# Patient Record
Sex: MALE | URBAN - METROPOLITAN AREA
[De-identification: names, ages, dates, MRNs, and addresses within clinical notes are randomized per-mention and may not be internally consistent; named-entity substitution may affect disease eponyms.]

---

## 2022-11-10 ENCOUNTER — APPOINTMENT (RX ONLY)
Dept: URBAN - METROPOLITAN AREA CLINIC 374 | Facility: CLINIC | Age: 22
Setting detail: DERMATOLOGY
End: 2022-11-10

## 2022-11-10 DIAGNOSIS — L63.8 OTHER ALOPECIA AREATA: ICD-10-CM

## 2022-11-10 PROCEDURE — 99203 OFFICE O/P NEW LOW 30 MIN: CPT

## 2022-11-10 PROCEDURE — ? PHOTO-DOCUMENTATION

## 2022-11-10 PROCEDURE — ? MEDICATION COUNSELING

## 2022-11-10 PROCEDURE — ? PRESCRIPTION

## 2022-11-10 PROCEDURE — ? COUNSELING

## 2022-11-10 PROCEDURE — ? ORDER TESTS

## 2022-11-10 PROCEDURE — ? PRESCRIPTION MEDICATION MANAGEMENT

## 2022-11-10 RX ORDER — TRIAMCINOLONE ACETONIDE 1 MG/ML
1 LOTION TOPICAL QHS
Qty: 60 | Refills: 2 | Status: ERX | COMMUNITY
Start: 2022-11-10

## 2022-11-10 RX ADMIN — TRIAMCINOLONE ACETONIDE 1: 1 LOTION TOPICAL at 00:00

## 2022-11-10 ASSESSMENT — LOCATION DETAILED DESCRIPTION DERM
LOCATION DETAILED: RIGHT CHIN
LOCATION DETAILED: LEFT POSTAURICULAR SKIN
LOCATION DETAILED: SUPERIOR MID FOREHEAD

## 2022-11-10 ASSESSMENT — LOCATION SIMPLE DESCRIPTION DERM
LOCATION SIMPLE: CHIN
LOCATION SIMPLE: POSTERIOR SCALP
LOCATION SIMPLE: SUPERIOR FOREHEAD

## 2022-11-10 ASSESSMENT — LOCATION ZONE DERM
LOCATION ZONE: SCALP
LOCATION ZONE: FACE

## 2022-11-10 NOTE — PROCEDURE: ORDER TESTS
Bill For Surgical Tray: no
Expected Date Of Service: 11/10/2022
Billing Type: Third-Party Bill
Performing Laboratory: -[id number]

## 2023-01-13 NOTE — PROCEDURE: MEDICATION COUNSELING
James Boyle Hydroxychloroquine Counseling:  I discussed with the patient that a baseline ophthalmologic exam is needed at the start of therapy and every year thereafter while on therapy. A CBC may also be warranted for monitoring.  The side effects of this medication were discussed with the patient, including but not limited to agranulocytosis, aplastic anemia, seizures, rashes, retinopathy, and liver toxicity. Patient instructed to call the office should any adverse effect occur.  The patient verbalized understanding of the proper use and possible adverse effects of Plaquenil.  All the patient's questions and concerns were addressed.

## 2023-01-18 ENCOUNTER — APPOINTMENT (RX ONLY)
Dept: URBAN - METROPOLITAN AREA CLINIC 374 | Facility: CLINIC | Age: 23
Setting detail: DERMATOLOGY
End: 2023-01-18

## 2023-01-18 DIAGNOSIS — L63.8 OTHER ALOPECIA AREATA: ICD-10-CM | Status: STABLE

## 2023-01-18 PROCEDURE — ? COUNSELING

## 2023-01-18 PROCEDURE — ? MEDICATION COUNSELING

## 2023-01-18 PROCEDURE — ? PRESCRIPTION MEDICATION MANAGEMENT

## 2023-01-18 PROCEDURE — ? PHOTO-DOCUMENTATION

## 2023-01-18 PROCEDURE — ? PRESCRIPTION

## 2023-01-18 PROCEDURE — 99213 OFFICE O/P EST LOW 20 MIN: CPT

## 2023-01-18 PROCEDURE — ? DEFER

## 2023-01-18 RX ORDER — CLOBETASOL PROPIONATE 0.5 MG/ML
SOLUTION TOPICAL QHS
Qty: 50 | Refills: 2 | Status: ERX | COMMUNITY
Start: 2023-01-18

## 2023-01-18 RX ADMIN — CLOBETASOL PROPIONATE: 0.5 SOLUTION TOPICAL at 00:00

## 2023-01-18 ASSESSMENT — LOCATION SIMPLE DESCRIPTION DERM
LOCATION SIMPLE: SUPERIOR FOREHEAD
LOCATION SIMPLE: POSTERIOR SCALP
LOCATION SIMPLE: CHIN

## 2023-01-18 ASSESSMENT — LOCATION DETAILED DESCRIPTION DERM
LOCATION DETAILED: SUPERIOR MID FOREHEAD
LOCATION DETAILED: LEFT POSTAURICULAR SKIN
LOCATION DETAILED: MID-OCCIPITAL SCALP
LOCATION DETAILED: RIGHT CHIN

## 2023-01-18 ASSESSMENT — LOCATION ZONE DERM
LOCATION ZONE: FACE
LOCATION ZONE: SCALP

## 2023-01-18 NOTE — PROCEDURE: PRESCRIPTION MEDICATION MANAGEMENT
Render In Strict Bullet Format?: No
Plan: Recommended OTC Minoxidil 5% topical foam/solution QAM\\n\\nPt will go to get his labs done, order sent in November.
Initiate Treatment: clobetasol 0.05% scalp solution: Scatter several drops onto affected areas of scalp QHS (do not use on face)
Continue Regimen: triamcinolone acetonide 0.1% lotion: Apply a thin layer QHS to AA of beard area
Detail Level: Zone

## 2023-01-18 NOTE — PROCEDURE: MEDICATION COUNSELING
none 5-Fu Pregnancy And Lactation Text: This medication is Pregnancy Category X and contraindicated in pregnancy and in women who may become pregnant. It is unknown if this medication is excreted in breast milk.

## 2023-01-18 NOTE — PROCEDURE: DEFER
Detail Level: Simple
Size Of Lesion In Cm (Optional): 0
Introduction Text (Please End With A Colon): The following was deferred:
Procedure To Be Performed At Next Visit: Intralesional Kenalog

## 2023-01-18 NOTE — PROCEDURE: MEDICATION COUNSELING
Alert Cibinqo Pregnancy And Lactation Text: It is unknown if this medication will adversely affect pregnancy or breast feeding.  You should not take this medication if you are currently pregnant or planning a pregnancy or while breastfeeding.

## 2023-04-29 NOTE — PROCEDURE: MEDICATION COUNSELING
Pt aaox4. VSS on RA. Visi monitoring in place. Pt had palliative radiation yesterday. Pt still having small pellet BM's, stated 1 was bloody and two were normal. Pt states he is urinating without difficulty. Plan for colostomy next week. Prn tylenol and mucinex given x1 overnight. Pt up independently to bathroom. Wife at bedside assisting with care. VS and assessments in flowsheets.   Opioid Counseling: I discussed with the patient the potential side effects of opioids including but not limited to addiction, altered mental status, and depression. I stressed avoiding alcohol, benzodiazepines, muscle relaxants and sleep aids unless specifically okayed by a physician. The patient verbalized understanding of the proper use and possible adverse effects of opioids. All of the patient's questions and concerns were addressed. They were instructed to flush the remaining pills down the toilet if they did not need them for pain.

## 2023-07-06 ENCOUNTER — APPOINTMENT (RX ONLY)
Dept: URBAN - METROPOLITAN AREA CLINIC 374 | Facility: CLINIC | Age: 23
Setting detail: DERMATOLOGY
End: 2023-07-06

## 2023-07-06 DIAGNOSIS — L63.8 OTHER ALOPECIA AREATA: ICD-10-CM

## 2023-07-06 PROCEDURE — ? ORDER TESTS

## 2023-07-06 PROCEDURE — ? COUNSELING

## 2023-07-06 PROCEDURE — ? PRESCRIPTION MEDICATION MANAGEMENT

## 2023-07-06 PROCEDURE — ? RECOMMENDATIONS

## 2023-07-06 PROCEDURE — ? PRESCRIPTION

## 2023-07-06 PROCEDURE — ? PHOTO-DOCUMENTATION

## 2023-07-06 PROCEDURE — ? MEDICATION COUNSELING

## 2023-07-06 PROCEDURE — 99214 OFFICE O/P EST MOD 30 MIN: CPT

## 2023-07-06 PROCEDURE — ? ADDITIONAL NOTES

## 2023-07-06 RX ORDER — CLOBETASOL PROPIONATE 0.5 MG/ML
SOLUTION TOPICAL QHS
Qty: 50 | Refills: 2 | Status: ERX

## 2023-07-06 RX ORDER — TRIAMCINOLONE ACETONIDE 1 MG/ML
1 LOTION TOPICAL QHS
Qty: 60 | Refills: 2 | Status: ERX

## 2023-07-06 ASSESSMENT — LOCATION DETAILED DESCRIPTION DERM
LOCATION DETAILED: MID-OCCIPITAL SCALP
LOCATION DETAILED: RIGHT CHIN
LOCATION DETAILED: SUPERIOR MID FOREHEAD
LOCATION DETAILED: LEFT POSTAURICULAR SKIN

## 2023-07-06 ASSESSMENT — LOCATION SIMPLE DESCRIPTION DERM
LOCATION SIMPLE: SUPERIOR FOREHEAD
LOCATION SIMPLE: CHIN
LOCATION SIMPLE: POSTERIOR SCALP

## 2023-07-06 ASSESSMENT — LOCATION ZONE DERM
LOCATION ZONE: FACE
LOCATION ZONE: SCALP

## 2023-07-06 NOTE — PROCEDURE: PRESCRIPTION MEDICATION MANAGEMENT
Render In Strict Bullet Format?: No
Plan: d/c \\ntriamcinolone acetonide 0.1% lotion: Apply a thin layer QHS to AA of beard area\\nclobetasol 0.05% scalp solution: Scatter several drops onto affected areas of scalp QHS (do not use on face)
Continue Regimen: triamcinolone acetonide 0.1% lotion: Apply a thin layer QHS to AA of beard area\\nclobetasol 0.05% scalp solution: Scatter several drops onto affected areas of scalp QHS (do not use on face)
Detail Level: Zone

## 2023-07-06 NOTE — PROCEDURE: MIPS QUALITY
Detail Level: Detailed
Quality 130: Documentation Of Current Medications In The Medical Record: Current Medications Documented
Quality 431: Preventive Care And Screening: Unhealthy Alcohol Use - Screening: Patient did not receive brief counseling if identified as an unhealthy alcohol user

## 2023-07-06 NOTE — PROCEDURE: ORDER TESTS
Bill For Surgical Tray: no
Billing Type: Third-Party Bill
Lab Facility: [id number]
Expected Date Of Service: 07/06/2023
Performing Laboratory: -[id number]

## 2023-07-06 NOTE — PROCEDURE: ADDITIONAL NOTES
Additional Notes: pt will have labs sent from PCP
Detail Level: Zone
Render Risk Assessment In Note?: yes

## 2023-07-06 NOTE — PROCEDURE: RECOMMENDATIONS
Recommendation Preamble: The following recommendations were made during the visit:
Recommendations (Free Text): minoxidil 5% BID
Render Risk Assessment In Note?: no
Detail Level: Zone

## 2024-08-02 ENCOUNTER — APPOINTMENT (EMERGENCY)
Dept: RADIOLOGY | Facility: HOSPITAL | Age: 24
End: 2024-08-02
Attending: STUDENT IN AN ORGANIZED HEALTH CARE EDUCATION/TRAINING PROGRAM

## 2024-08-02 ENCOUNTER — HOSPITAL ENCOUNTER (EMERGENCY)
Facility: HOSPITAL | Age: 24
Discharge: HOME | End: 2024-08-03
Attending: STUDENT IN AN ORGANIZED HEALTH CARE EDUCATION/TRAINING PROGRAM
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
## 2024-08-02 DIAGNOSIS — S01.312A EAR LOBE LACERATION, LEFT, INITIAL ENCOUNTER: ICD-10-CM

## 2024-08-02 LAB
ALBUMIN SERPL-MCNC: 4 G/DL (ref 3.5–5.7)
ALP SERPL-CCNC: 57 IU/L (ref 34–125)
ALT SERPL-CCNC: 14 IU/L (ref 7–52)
AMPHET UR QL SCN: NOT DETECTED
ANION GAP SERPL CALC-SCNC: 8 MEQ/L (ref 3–15)
APTT PPP: 28 SEC (ref 23–35)
AST SERPL-CCNC: 21 IU/L (ref 13–39)
BARBITURATES UR QL SCN: NOT DETECTED
BASOPHILS # BLD: 0.03 K/UL (ref 0.01–0.1)
BASOPHILS NFR BLD: 0.4 %
BENZODIAZ UR QL SCN: NOT DETECTED
BILIRUB SERPL-MCNC: 0.2 MG/DL (ref 0.3–1.2)
BILIRUB UR QL STRIP.AUTO: NEGATIVE MG/DL
BUN SERPL-MCNC: 12 MG/DL (ref 7–25)
CALCIUM SERPL-MCNC: 7.7 MG/DL (ref 8.6–10.3)
CANNABINOIDS UR QL SCN: POSITIVE
CHLORIDE SERPL-SCNC: 112 MEQ/L (ref 98–107)
CLARITY UR REFRACT.AUTO: CLEAR
CO2 SERPL-SCNC: 25 MEQ/L (ref 21–31)
COCAINE UR QL SCN: NOT DETECTED
COLOR UR AUTO: COLORLESS
CREAT SERPL-MCNC: 1.1 MG/DL (ref 0.7–1.3)
DIFFERENTIAL METHOD BLD: ABNORMAL
EGFRCR SERPLBLD CKD-EPI 2021: >60 ML/MIN/1.73M*2
EOSINOPHIL # BLD: 0.25 K/UL (ref 0.04–0.54)
EOSINOPHIL NFR BLD: 3.4 %
ERYTHROCYTE [DISTWIDTH] IN BLOOD BY AUTOMATED COUNT: 13 % (ref 11.6–14.4)
ETHANOL SERPL-MCNC: 441 MG/DL
FENTANYL URINE SCR: NOT DETECTED
GLUCOSE SERPL-MCNC: 102 MG/DL (ref 70–99)
GLUCOSE UR STRIP.AUTO-MCNC: NEGATIVE MG/DL
HCT VFR BLD AUTO: 37.8 % (ref 40.1–51)
HGB BLD-MCNC: 13 G/DL (ref 13.7–17.5)
HGB UR QL STRIP.AUTO: NEGATIVE
IMM GRANULOCYTES # BLD AUTO: 0.01 K/UL (ref 0–0.08)
IMM GRANULOCYTES NFR BLD AUTO: 0.1 %
INR PPP: 1
KETONES UR STRIP.AUTO-MCNC: NEGATIVE MG/DL
LEUKOCYTE ESTERASE UR QL STRIP.AUTO: NEGATIVE
LIPASE SERPL-CCNC: 35 U/L (ref 11–82)
LYMPHOCYTES # BLD: 3.57 K/UL (ref 1.2–3.5)
LYMPHOCYTES NFR BLD: 48 %
MCH RBC QN AUTO: 33.3 PG (ref 28–33.2)
MCHC RBC AUTO-ENTMCNC: 34.4 G/DL (ref 32.2–36.5)
MCV RBC AUTO: 96.9 FL (ref 83–98)
MONOCYTES # BLD: 0.36 K/UL (ref 0.3–1)
MONOCYTES NFR BLD: 4.8 %
NEUTROPHILS # BLD: 3.21 K/UL (ref 1.7–7)
NEUTS SEG NFR BLD: 43.3 %
NITRITE UR QL STRIP.AUTO: NEGATIVE
NRBC BLD-RTO: 0 %
OPIATES UR QL SCN: NOT DETECTED
PCP UR QL SCN: NOT DETECTED
PDW BLD AUTO: 9 FL (ref 9.4–12.4)
PH UR STRIP.AUTO: 6.5 [PH]
PLATELET # BLD AUTO: 240 K/UL (ref 150–350)
POTASSIUM SERPL-SCNC: 3.3 MEQ/L (ref 3.5–5.1)
PROT SERPL-MCNC: 6 G/DL (ref 6–8.2)
PROT UR QL STRIP.AUTO: NEGATIVE
PROTHROMBIN TIME: 13.4 SEC (ref 12.2–14.5)
RBC # BLD AUTO: 3.9 M/UL (ref 4.5–5.8)
SODIUM SERPL-SCNC: 145 MEQ/L (ref 136–145)
SP GR UR REFRACT.AUTO: 1.01
UROBILINOGEN UR STRIP-ACNC: 0.2 EU/DL
WBC # BLD AUTO: 7.43 K/UL (ref 3.8–10.5)

## 2024-08-02 PROCEDURE — 99284 EMERGENCY DEPT VISIT MOD MDM: CPT | Mod: 25

## 2024-08-02 PROCEDURE — 72125 CT NECK SPINE W/O DYE: CPT

## 2024-08-02 PROCEDURE — 74177 CT ABD & PELVIS W/CONTRAST: CPT

## 2024-08-02 PROCEDURE — 86900 BLOOD TYPING SEROLOGIC ABO: CPT

## 2024-08-02 PROCEDURE — 36415 COLL VENOUS BLD VENIPUNCTURE: CPT | Performed by: STUDENT IN AN ORGANIZED HEALTH CARE EDUCATION/TRAINING PROGRAM

## 2024-08-02 PROCEDURE — 63600105 HC IODINE BASED CONTRAST: Mod: JZ | Performed by: STUDENT IN AN ORGANIZED HEALTH CARE EDUCATION/TRAINING PROGRAM

## 2024-08-02 PROCEDURE — 96360 HYDRATION IV INFUSION INIT: CPT

## 2024-08-02 PROCEDURE — 86901 BLOOD TYPING SEROLOGIC RH(D): CPT

## 2024-08-02 PROCEDURE — 86850 RBC ANTIBODY SCREEN: CPT

## 2024-08-02 PROCEDURE — 85610 PROTHROMBIN TIME: CPT | Performed by: STUDENT IN AN ORGANIZED HEALTH CARE EDUCATION/TRAINING PROGRAM

## 2024-08-02 PROCEDURE — G0480 DRUG TEST DEF 1-7 CLASSES: HCPCS | Performed by: STUDENT IN AN ORGANIZED HEALTH CARE EDUCATION/TRAINING PROGRAM

## 2024-08-02 PROCEDURE — 0JQ13ZZ REPAIR FACE SUBCUTANEOUS TISSUE AND FASCIA, PERCUTANEOUS APPROACH: ICD-10-PCS | Performed by: STUDENT IN AN ORGANIZED HEALTH CARE EDUCATION/TRAINING PROGRAM

## 2024-08-02 PROCEDURE — 80307 DRUG TEST PRSMV CHEM ANLYZR: CPT | Performed by: STUDENT IN AN ORGANIZED HEALTH CARE EDUCATION/TRAINING PROGRAM

## 2024-08-02 PROCEDURE — 81003 URINALYSIS AUTO W/O SCOPE: CPT | Performed by: STUDENT IN AN ORGANIZED HEALTH CARE EDUCATION/TRAINING PROGRAM

## 2024-08-02 PROCEDURE — 99283 EMERGENCY DEPT VISIT LOW MDM: CPT | Performed by: STUDENT IN AN ORGANIZED HEALTH CARE EDUCATION/TRAINING PROGRAM

## 2024-08-02 PROCEDURE — 70450 CT HEAD/BRAIN W/O DYE: CPT

## 2024-08-02 PROCEDURE — 96361 HYDRATE IV INFUSION ADD-ON: CPT

## 2024-08-02 PROCEDURE — 80053 COMPREHEN METABOLIC PANEL: CPT | Performed by: STUDENT IN AN ORGANIZED HEALTH CARE EDUCATION/TRAINING PROGRAM

## 2024-08-02 PROCEDURE — 83690 ASSAY OF LIPASE: CPT | Performed by: STUDENT IN AN ORGANIZED HEALTH CARE EDUCATION/TRAINING PROGRAM

## 2024-08-02 PROCEDURE — 71260 CT THORAX DX C+: CPT

## 2024-08-02 PROCEDURE — 85730 THROMBOPLASTIN TIME PARTIAL: CPT | Performed by: STUDENT IN AN ORGANIZED HEALTH CARE EDUCATION/TRAINING PROGRAM

## 2024-08-02 PROCEDURE — 85025 COMPLETE CBC W/AUTO DIFF WBC: CPT | Performed by: STUDENT IN AN ORGANIZED HEALTH CARE EDUCATION/TRAINING PROGRAM

## 2024-08-02 RX ORDER — IOPAMIDOL 755 MG/ML
100 INJECTION, SOLUTION INTRAVASCULAR
Status: COMPLETED | OUTPATIENT
Start: 2024-08-02 | End: 2024-08-02

## 2024-08-02 RX ADMIN — IOPAMIDOL 100 ML: 755 INJECTION, SOLUTION INTRAVENOUS at 23:17

## 2024-08-03 VITALS
SYSTOLIC BLOOD PRESSURE: 125 MMHG | TEMPERATURE: 96.9 F | HEART RATE: 78 BPM | WEIGHT: 133.38 LBS | RESPIRATION RATE: 16 BRPM | DIASTOLIC BLOOD PRESSURE: 75 MMHG | OXYGEN SATURATION: 94 %

## 2024-08-03 LAB
ABO + RH BLD: NORMAL
BLD GP AB SCN SERPL QL: NEGATIVE
D AG BLD QL: POSITIVE
LABORATORY COMMENT REPORT: NORMAL
LABORATORY COMMENT REPORT: NORMAL
SPECIMEN EXP DATE BLD: NORMAL

## 2024-08-03 PROCEDURE — 63600000 HC DRUGS/DETAIL CODE: Mod: JZ

## 2024-08-03 RX ORDER — CIPROFLOXACIN 500 MG/1
500 TABLET ORAL 2 TIMES DAILY
Qty: 14 TABLET | Refills: 0 | Status: SHIPPED | OUTPATIENT
Start: 2024-08-03 | End: 2024-08-10

## 2024-08-03 RX ADMIN — SODIUM CHLORIDE, POTASSIUM CHLORIDE, SODIUM LACTATE AND CALCIUM CHLORIDE 1000 ML: 600; 310; 30; 20 INJECTION, SOLUTION INTRAVENOUS at 00:54

## 2024-08-03 NOTE — DISCHARGE INSTRUCTIONS
You were seen in emergency department after a fall you are intoxicated at that time you had a CT scan which revealed no other major injuries you did have a laceration to your ear we were unable to fully repair the cartilage but would recommend following up with an ear nose and throat doctor and would also recommend using his antibiotics

## 2024-08-03 NOTE — ED PROVIDER NOTES
HPI    Chief Complaint   Patient presents with   • Trauma   • Fall        HPI   24-year-old male who presents via EMS for fall.  Patient was reportedly drinking and smoking marijuana when he fell down 15 hardwood stairs.  Was witnessed by bystanders, no reported LOC.  Patient does not take any medications per report.    Per EMS, patient was initially GCS 15 but clearly intoxicated, reportedly combative and uncooperative with EMS and so received 5 mg Versed and transported to hospital.  Upon arrival, patient not responding to verbal or painful stimuli.  Unable to obtain further history.      History reviewed. No pertinent past medical history.      History reviewed. No pertinent surgical history.    History reviewed. No pertinent family history.    Social History     Tobacco Use   • Smoking status: Unknown   Substance Use Topics   • Alcohol use: Yes   • Drug use: Defer       Systems Reviewed from Nursing Triage:               Review of Systems         ED Triage Vitals [08/02/24 2259]   Temp Heart Rate Resp BP SpO2   (!) 36.1 °C (96.9 °F) 90 16 (!) 118/56 96 %      Temp Source Heart Rate Source Patient Position BP Location FiO2 (%) (Set)   Temporal Monitor Lying Right upper arm --       Vitals:    08/02/24 2303 08/02/24 2310 08/02/24 2327 08/02/24 2330   BP:  (!) 107/58  (!) 106/58   BP Location:  Right upper arm  Right upper arm   Patient Position:  Lying  Lying   Pulse: 87 83  84   Resp: (!) 28 16  16   Temp:       TempSrc:       SpO2: 99% 100%  96%   Weight:   60.5 kg (133 lb 6.1 oz)                          Physical Exam   Breathing Effort: Spontaneous Breath Sounds Right: Clear Breath Sounds Left: Clear  Skin: Within Defined Limits Skin Color: Appropriate for ethnicity Bilateral Radial Pulses: 2+ (normal) Bilateral Femoral Pulses: 2+ (normal) Bilateral Dorsalis Pedis Pulses: other (see comments) Capillary Refill, General: less than/equal to 3 secs Uncontrolled External Bleeding: No Emergency Release Blood: No  Was Massive Transfusion Protocol (MTP) initiated?: No  Best Eye Response: 1-->(E1) none Best Verbal Response: 1-->(V1) none Best Motor Response: 1-->(M1) none Lorena Coma Scale Score: 3 Assessment Qualifiers: patient chemically sedated or paralyzed (pt given 5mg Versed PTA by EMS)  Pupil PERRL: yes Pupil Size Left: 4 mm Pupil Shape Left: round Pupil Reaction Left: brisk Pupil Size Right: 4 mm Pupil Shape Right: round Pupil Reaction Right: brisk  Clothing removed/Clothing cut: Removed Clothing removed/cut : In ED  Room Temp Control 78° F?: Yes Warming Devices: blanket warmed  Head/Face: Laceration Eyes: Within Defined Limits Ears: Other (Comment) (laceration) Nose: Within Defined Limits Mouth: Within Defined Limits  Neck: Within Defined Limits  Chest: Within Defined Limits  Abdomen : Within Defined Limits Pelvis: Within Defined Limits  - Blood At Meatus: No  R Upper Extremity: Within Defined Limits RUE Muscle Strength Gradin - no contraction L Upper Extremity: Within Defined Limits LUE Muscle Strength Gradin - no contraction R Lower Extremity: Within Defined Limits RLE Muscle Strength Gradin - no contraction L Lower Extremity: Other (Comment) (knee abrasion) LLE Muscle Strength Gradin - no contraction  Posterior Inspection: Within Defined Limits          CT HEAD WITHOUT IV CONTRAST    (Results Pending)   CT CERVICAL SPINE WITHOUT IV CONTRAST    (Results Pending)   CT CHEST WITH IV CONTRAST    (Results Pending)   CT ABDOMEN PELVIS WITH IV CONTRAST    (Results Pending)       Labs Reviewed   CBC AND DIFF - Abnormal       Result Value    WBC 7.43      RBC 3.90 (*)     Hemoglobin 13.0 (*)     Hematocrit 37.8 (*)     MCV 96.9      MCH 33.3 (*)     MCHC 34.4      RDW 13.0      Platelets 240      MPV 9.0 (*)     Differential Type Auto      nRBC 0.0      Immature Granulocytes 0.1      Neutrophils 43.3      Lymphocytes 48.0      Monocytes 4.8      Eosinophils 3.4      Basophils 0.4      Immature  Granulocytes, Absolute 0.01      Neutrophils, Absolute 3.21      Lymphocytes, Absolute 3.57 (*)     Monocytes, Absolute 0.36      Eosinophils, Absolute 0.25      Basophils, Absolute 0.03     COMPREHENSIVE METABOLIC PANEL - Abnormal    Sodium 145      Potassium 3.3 (*)     Chloride 112 (*)     CO2 25      BUN 12      Creatinine 1.1      Glucose 102 (*)     Calcium 7.7 (*)     AST (SGOT) 21      ALT (SGPT) 14      Alkaline Phosphatase 57      Total Protein 6.0      Albumin 4.0      Bilirubin, Total 0.2 (*)     eGFR >60.0      Anion Gap 8     DRUG SCREEN PANEL, URINE - Abnormal    PCP Scrn, Ur Not Detected      Benzodiazepine Ur Qual Not Detected      Cocaine Screen, Urine Not Detected      Amphetamine+Methamphetamine Screen, Ur Not Detected      Cannabinoid Screen, Urine Positive (*)     Opiate Scrn, Ur Not Detected      Barbiturate Screen, Ur Not Detected      Fentanyl Screen, Urine Not Detected     LIPASE - Normal    Lipase 35     PROTIME-INR - Normal    PT 13.4      INR 1.0     PTT - Normal    PTT 28     UA MACROSCOPIC - Normal    Color, Urine Colorless      Clarity, Urine Clear      Specific Gravity, Urine 1.007      pH, Urine 6.5      Leukocyte Esterase Negative      Nitrite, Urine Negative      Protein, Urine Negative      Glucose, Urine Negative      Ketones, Urine Negative      Urobilinogen, Urine 0.2      Bilirubin, Urine Negative      Blood, Urine Negative     URINALYSIS W REFLEX TO MICROSCOPIC    Narrative:     The following orders were created for panel order Urinalysis.  Procedure                               Abnormality         Status                     ---------                               -----------         ------                     UA Macroscopic[[id number]]               Normal              Final result                 Please view results for these tests on the individual orders.   ETHANOL   TYPE AND SCREEN   RAINBOW DRAW PANEL    Narrative:     The following orders were created for panel order  Cable Draw Panel.  Procedure                               Abnormality         Status                     ---------                               -----------         ------                     RAINBOW RED[[id number]]                                      In process                 RAINBOW GOLD[[id number]]                                     In process                 RAINBOW LT GREEN[[id number]]                                 In process                   Please view results for these tests on the individual orders.   RAINBOW RED   RAINBOW GOLD   RAINBOW LT GREEN       CT HEAD WITHOUT IV CONTRAST    (Results Pending)   CT CERVICAL SPINE WITHOUT IV CONTRAST    (Results Pending)   CT CHEST WITH IV CONTRAST    (Results Pending)   CT ABDOMEN PELVIS WITH IV CONTRAST    (Results Pending)         Critical Care    Performed by: Mauricio Mccullough MD  Authorized by: Mauricio Mccullough MD    Critical care provider statement:     Critical care time (minutes):  30    Critical care time was exclusive of:  Separately billable procedures and treating other patients    Critical care was necessary to treat or prevent imminent or life-threatening deterioration of the following conditions:  CNS failure or compromise and trauma    Critical care was time spent personally by me on the following activities:  Ordering and performing treatments and interventions, ordering and review of laboratory studies, ordering and review of radiographic studies, re-evaluation of patient's condition, evaluation of patient's response to treatment, examination of patient and discussions with consultants      Final diagnoses:   None       ED Course & MDM     ED Course as of 08/03/24 0520   Fri Aug 02, 2024   2347 Ethanol(!!): 441 [MS]   2347 Cannabinoid Screen, Urine(!): Positive [MS]   2347 Potassium, Bld(!): 3.3 [MS]   Sat Aug 03, 2024   0016 Discussed with trauma, no evidence of acute traumatic injuries on CT pan scan.  Trauma team to suture left ear laceration.   Plan for metabolize, reassessment when sober for dispo [MS]   0253 6 sutures placed lacerations of L ear  [DB]   0520 Patient is otherwise well-appearing at this time ambulatory without difficulty speaking full sentences spoke to his sister on the phone who was able to order a movement states she will be waiting for him when she gets home was given the number to call back if there are any issues [RW]      ED Course User Index  [DB] Jane Norris PA C  [MS] Mauricio Mccullough MD  [RW] Kwame Lama MD           Medical Decision Making  Amount and/or Complexity of Data Reviewed  Labs: ordered.  Radiology: ordered.    Risk  Prescription drug management.        11:42 PM      Impression/Medical Decision Makin-year-old male who presents after fall while intoxicated.  No LOC, no blood thinners.  Initial GCS 15 per EMS but patient was uncooperative and clearly intoxicated so received 5 mg Versed in the prehospital setting.  Arrived unresponsive but protecting airway, upgraded to code 9 activation on arrival, trauma team already present.    Primary survey intact, GCS 3 in the setting of Versed and EtOH  Given GCS 15 prior to administration of benzodiazepines and patient protecting airway, will closely monitor with pulse ox and end-tidal but defer intubation at this point  Secondary survey as above    Plan: IV, trauma labs, pan scan, observe    Vital Signs Review: Vital signs have been reviewed. The oxygen saturation is  SpO2: 96 % which is normal.      Mauricio Mccullough MD  2024      We are in a period of time with increased volumes and decreased capacity.     This document was created using dragon dictation software.  There might be some typographical errors due to this technology.       Mauricio Mccullough MD  [id number]       Mauricio Mccullough MD  24 [id number]

## 2024-08-03 NOTE — H&P
TRAUMA SURGERY:  HISTORY & PHYSICAL     PATIENT NAME:  Jai Wynn YOB: 2000    AGE:  24 y.o.  GENDER: male   MRN:  [id number]  PATIENT #: [id number]     Chief Complaint   Patient presents with    Trauma    Fall       HISTORY OF PRESENT ILLNESS/INJURY     Mechanism of Injury: fell down 15 steps     24 y.o. male who is intoxicated and had a witnessed fall down 15 steps. Combative with EMS and given 5 mg versed to facilitate transport    Relevant PMH: NONE     Pharmacological Risk Factors:   Oral Anti-Coagulation: NONE   Antiplatelet Therapy: NONE     Patient Vitals for the past 24 hrs:   BP Temp Temp src Pulse Resp SpO2 Weight   08/03/24 0049 100/60 -- -- 76 18 100 % --   08/02/24 2348 -- -- -- -- -- 98 % --   08/02/24 2330 (!) 106/58 -- -- 84 16 96 % --   08/02/24 2327 -- -- -- -- -- -- 60.5 kg (133 lb 6.1 oz)   08/02/24 2310 (!) 107/58 -- -- 83 16 100 % --   08/02/24 2303 -- -- -- 87 (!) 28 99 % --   08/02/24 2300 -- -- -- 95 (!) 25 98 % --   08/02/24 2259 (!) 118/56 (!) 36.1 °C (96.9 °F) Temporal 90 16 96 % --        REVIEW OF SYSTEMS     14 point ROS completed and pertinent positives as listed in HPI or as stated. All others negative.    PAST MEDICAL HISTORY     History reviewed. No pertinent past medical history.    PAST SURGICAL HISTORY     History reviewed. No pertinent surgical history.     FAMILY HISTORY     Non-contributory    SOCIAL HISTORY     Social History     Socioeconomic History    Marital status: Single     Spouse name: Not on file    Number of children: Not on file    Years of education: Not on file    Highest education level: Not on file   Occupational History    Not on file   Tobacco Use    Smoking status: Unknown    Smokeless tobacco: Not on file   Substance and Sexual Activity    Alcohol use: Yes    Drug use: Defer    Sexual activity: Not on file   Other Topics Concern    Not on file   Social History Narrative    Not on file     Social Determinants of Health      Financial Resource Strain: Not on file   Food Insecurity: Patient Unable To Answer (8/2/2024)    Hunger Vital Sign     Worried About Running Out of Food in the Last Year: Patient unable to answer     Ran Out of Food in the Last Year: Patient unable to answer   Transportation Needs: Not on file   Physical Activity: Not on file   Stress: Not on file   Social Connections: Not on file   Intimate Partner Violence: Not on file   Housing Stability: Not on file       HOME MEDICATIONS     none    ALLERGIES     unknown    PRIMARY CARE PHYSICIAN     Unable, To Obtain Pcp    PRIMARY SURVEY     Airway: Patent   Breathing: Spontaneous, non-labored, equal B/L breath sounds  Circulation:  Skin is pink/warm/dry, central and peripheral pulses present.  NSR   Disability: Initial GCS: 3. Unknown LOC  Exposure: complete    EYES:  4 = open spontaneously  3 = open to voice  2 = open to pain  1 = none VERBAL:  5 = oriented  4 = confused  3 = inappropriate words  2 = incomprehensible sounds  1 = none MOTOR:  6 = obeys   5 = localizes  4 = withdraws  3 = decortication (flexion)  2 = decerebration (extension)  1 = none or triple flexion     RESUSCITATION:     O2: 2 L NC Lines/Location: PIV  Two large bore PIVs:  No    Endotracheal Intubation: no Gastric Intubation: NONE   IVF/Blood: 1L NS bolus Antibiotic(s): none   Chest Tube(s):   R size:             L size: Tetanus:  Not Indicated   Manzano: no        SECONDARY SURVEY     NEURO: GCS 3.   HEENT: Left posterior auricular hematoma. Midface is stable. ZAINA @ 3mm, EOMi. Neck supple. Trachea ML.   SPINE: C-Collar in situ. There are no stepoffs/deformities.   CHEST: Equal chest expansion, chest wall stable, no crepitus.  LUNGS: LCTA bilaterally. No use of accessory muscles or respiratory distress.   CV: RRR, S1/S2. +2 radial/DP/femoral pulses.  ABDOMEN: Soft, nontender, nondistended.   PELVIS: Stable, non-tender.   : Genitalia unremarkable.  RECTAL: Digital rectal exam deferred.    EXTREMITIES: No palpable deformities.    SKIN: right thumb abrasion and left knee abrasion    Fast Exam: Deferred     DIAGNOSTIC DATA     LABS:  Recent Results (from the past 24 hour(s))   CBC and differential    Collection Time: 08/02/24 11:04 PM   Result Value Ref Range    WBC 7.43 3.80 - 10.50 K/uL    RBC 3.90 (L) 4.50 - 5.80 M/uL    Hemoglobin 13.0 (L) 13.7 - 17.5 g/dL    Hematocrit 37.8 (L) 40.1 - 51.0 %    MCV 96.9 83.0 - 98.0 fL    MCH 33.3 (H) 28.0 - 33.2 pg    MCHC 34.4 32.2 - 36.5 g/dL    RDW 13.0 11.6 - 14.4 %    Platelets 240 150 - 350 K/uL    MPV 9.0 (L) 9.4 - 12.4 fL    Differential Type Auto     nRBC 0.0 <=0.0 %    Immature Granulocytes 0.1 %    Neutrophils 43.3 %    Lymphocytes 48.0 %    Monocytes 4.8 %    Eosinophils 3.4 %    Basophils 0.4 %    Immature Granulocytes, Absolute 0.01 0.00 - 0.08 K/uL    Neutrophils, Absolute 3.21 1.70 - 7.00 K/uL    Lymphocytes, Absolute 3.57 (H) 1.20 - 3.50 K/uL    Monocytes, Absolute 0.36 0.30 - 1.00 K/uL    Eosinophils, Absolute 0.25 0.04 - 0.54 K/uL    Basophils, Absolute 0.03 0.01 - 0.10 K/uL   Comprehensive metabolic panel    Collection Time: 08/02/24 11:04 PM   Result Value Ref Range    Sodium 145 136 - 145 mEQ/L    Potassium 3.3 (L) 3.5 - 5.1 mEQ/L    Chloride 112 (H) 98 - 107 mEQ/L    CO2 25 21 - 31 mEQ/L    BUN 12 7 - 25 mg/dL    Creatinine 1.1 0.7 - 1.3 mg/dL    Glucose 102 (H) 70 - 99 mg/dL    Calcium 7.7 (L) 8.6 - 10.3 mg/dL    AST (SGOT) 21 13 - 39 IU/L    ALT (SGPT) 14 7 - 52 IU/L    Alkaline Phosphatase 57 34 - 125 IU/L    Total Protein 6.0 6.0 - 8.2 g/dL    Albumin 4.0 3.5 - 5.7 g/dL    Bilirubin, Total 0.2 (L) 0.3 - 1.2 mg/dL    eGFR >60.0 >=60.0 mL/min/1.73m*2    Anion Gap 8 3 - 15 mEQ/L   Ethanol, serum    Collection Time: 08/02/24 11:04 PM   Result Value Ref Range    Ethanol 441 (HH) <10 mg/dL   Lipase    Collection Time: 08/02/24 11:04 PM   Result Value Ref Range    Lipase 35 11 - 82 U/L   Protime-INR    Collection Time: 08/02/24 11:04 PM    Result Value Ref Range    PT 13.4 12.2 - 14.5 sec    INR 1.0     PTT    Collection Time: 08/02/24 11:04 PM   Result Value Ref Range    PTT 28 23 - 35 sec   Type and screen (blood bank hold specimen for 72 Hrs)    Collection Time: 08/02/24 11:04 PM   Result Value Ref Range    Specimen Expiration 08/05/2024     History Check No type on file     History Check No type on file    Drug screen panel, emergency    Collection Time: 08/02/24 11:05 PM   Result Value Ref Range    PCP Scrn, Ur Not Detected Not Detected    Benzodiazepine Ur Qual Not Detected Not Detected    Cocaine Screen, Urine Not Detected Not Detected    Amphetamine+Methamphetamine Screen, Ur Not Detected Not Detected    Cannabinoid Screen, Urine Positive (A) Not Detected    Opiate Scrn, Ur Not Detected Not Detected    Barbiturate Screen, Ur Not Detected Not Detected    Fentanyl Screen, Urine Not Detected Not Detected   UA Macroscopic    Collection Time: 08/02/24 11:05 PM   Result Value Ref Range    Color, Urine Colorless Yellow, Colorless    Clarity, Urine Clear Clear    Specific Gravity, Urine 1.007 1.005 - 1.030    pH, Urine 6.5 4.5 - 8.0    Leukocyte Esterase Negative Negative    Nitrite, Urine Negative Negative    Protein, Urine Negative Negative    Glucose, Urine Negative Negative mg/dL    Ketones, Urine Negative Negative mg/dL    Urobilinogen, Urine 0.2 <2.0 EU/dL EU/dL    Bilirubin, Urine Negative Negative mg/dL    Blood, Urine Negative Negative        Creatinine clearance cannot be calculated (Unknown ideal weight.)    IMAGINGS:    CT images - no traumatic injuries     IMPRESSION/PLAN     24 y.o. male s/p fall down steps   Heavily intoxicated   IVF   Will re-examine cervical spine once less inoxicated     DISPOSITION:  D/C Home - Dispo per ED

## 2024-08-03 NOTE — PROGRESS NOTES
"Jai Wynn   [id number]    Your doctor has referred you for a CT examination that requires the injection of an iodinated contrast material into your bloodstream. This iodinated contrast material, sometimes referred to as \"x-ray dye\" allows for better interpretation of the x-ray films or CT images and results in a more accurate interpretation of the examination.     Without the use of iodinated contrast (x-ray dye), the examination may be less informative and result in a suboptimal examination, and possibly a delay in diagnosis and, if needed, treatment.     The iodinated contrast material is given through a small needle or catheter placed into a vein, usually on the inside of the elbow, on the back of hand, or in a vein in the foot or lower leg.    The most common, though still rare, potential reaction to an intravenous contrast injection is an allergic-like reaction. Most allergic-like reactions are mild, though a small subset of people can have more severe reactions. Mild reactions include mild / scattered hives, itching, scratchy throat, sneezing and nasal congestion. More severe reactions include facial swelling, severe difficulty breathing, wheezing and anaphylactic shock. Those with a prior history allergic-like reaction to the same class of contrast media (such as CT contrast or MRI contrast) are at the highest risk for an allergic reaction.     If you believe you had an allergic reaction to contrast in the past, please let our staff know. We can determine if this increases your risk for a future reaction and provide steps to decrease the risk. This may delay your examination, but it decreases the risk of having a new and possibly more severe reaction to the contrast injection.    People with a history of prior allergic reactions to other substances (such as unrelated medications and food) and patients with a history of asthma have slightly increased risk for an allergic reaction from contrast " material when compared with the general population, but do not require to be pretreated prior to a contrast injection.    You should notify the physician, nurse or technologist if you have ever had any of these conditions or if you have any questions.

## 2024-08-03 NOTE — ED PROVIDER NOTES
Emergency Medicine Note  HPI   HISTORY OF PRESENT ILLNESS     HPI      Patient History   PAST HISTORY     Reviewed from Nursing Triage:       History reviewed. No pertinent past medical history.    History reviewed. No pertinent surgical history.    History reviewed. No pertinent family history.    Social History     Tobacco Use   • Smoking status: Unknown   Substance Use Topics   • Alcohol use: Yes   • Drug use: Defer         Review of Systems   REVIEW OF SYSTEMS     Review of Systems      VITALS     ED Vitals      Date/Time Temp Pulse Resp BP SpO2 [facility]   08/03/24 0149 -- 74 18 100/58 100 % DS   08/03/24 0119 -- 78 18 105/64 100 % DS   08/03/24 0049 -- 76 18 100/60 100 % DS   08/02/24 2348 -- -- -- -- 98 % DS   08/02/24 2330 -- 84 16 106/58 96 % LAS   08/02/24 2310 -- 83 16 107/58 100 % LAS   08/02/24 2303 -- 87 28 -- 99 % LAS   08/02/24 2300 -- 95 25 -- 98 % LAS   08/02/24 2259 36.1 °C (96.9 °F) 90 16 118/56 96 % LAS                         Physical Exam   PHYSICAL EXAM     Physical Exam      PROCEDURES     Laceration Repair    Date/Time: 8/3/2024 2:34 AM    Performed by: Jane Norris PA C  Authorized by: Mauricio Mccullough MD    Consent:     Consent obtained:  Verbal    Consent given by:  Patient    Risks discussed:  Infection, retained foreign body, pain, need for additional repair, nerve damage, poor wound healing, poor cosmetic result, tendon damage and vascular damage  Anesthesia:     Anesthesia method:  Local infiltration    Local anesthetic:  Lidocaine 1% w/o epi  Laceration details:     Location:  Ear    Ear location:  L ear (posterior L ear)    Length (cm):  4    Depth (mm):  1  Pre-procedure details:     Preparation:  Patient was prepped and draped in usual sterile fashion and imaging obtained to evaluate for foreign bodies  Treatment:     Area cleansed with:  Saline and Shur-Clens    Amount of cleaning:  Standard  Skin repair:     Repair method:  Sutures    Suture size:  5-0    Suture material:   Chromic gut    Suture technique:  Simple interrupted    Number of sutures:  4  Approximation:     Approximation:  Close  Post-procedure details:     Dressing:  Open (no dressing)    Procedure completion:  Tolerated well, no immediate complications  Laceration Repair    Date/Time: 8/3/2024 2:35 AM    Performed by: Jane Norris PA C  Authorized by: Mauricio Mccullough MD    Consent:     Consent obtained:  Verbal    Consent given by:  Patient    Risks discussed:  Infection, retained foreign body, pain, need for additional repair, nerve damage, poor wound healing, poor cosmetic result, tendon damage and vascular damage  Anesthesia:     Anesthesia method:  Local infiltration    Local anesthetic:  Lidocaine 1% w/o epi  Laceration details:     Location:  Ear    Ear location:  L ear (anterior ear)    Length (cm):  2    Depth (mm):  1  Pre-procedure details:     Preparation:  Patient was prepped and draped in usual sterile fashion and imaging obtained to evaluate for foreign bodies  Treatment:     Area cleansed with:  Saline and Shur-Clens    Amount of cleaning:  Standard  Skin repair:     Repair method:  Sutures    Suture size:  5-0    Suture material:  Chromic gut    Suture technique:  Simple interrupted    Number of sutures:  2  Approximation:     Approximation:  Close  Post-procedure details:     Dressing:  Open (no dressing)    Procedure completion:  Tolerated well, no immediate complications  Comments:      My only interaction with this patient was to repair both ear lacerations. I was not part of the rest of the patients care        DATA     Results       Procedure Component Value Units Date/Time    Type and screen (blood bank hold specimen for 72 Hrs) [[id number]] Collected: 08/02/24 2304    Specimen: Blood, Venous Updated: 08/03/24 0155     Specimen Expiration 08/05/2024     Antibody Screen Negative     ABO O     Rh Factor Positive     History Check No type on file     History Check No type on file     Comment:  BLTR requested to Kee @3998       Ethanol, serum [[id number]]  (Abnormal) Collected: 08/02/24 2304    Specimen: Blood, Venous Updated: 08/02/24 2345     Ethanol 441 mg/dL     Drug screen panel, emergency [[id number]]  (Abnormal) Collected: 08/02/24 2305    Specimen: Urine, Clean Catch Updated: 08/02/24 2341     PCP Scrn, Ur Not Detected     Comment: Assay Detects: phencyclidine in urine. Lowest detectable concentration is 25 ng/mL of phencyclidine.        Benzodiazepine Ur Qual Not Detected     Comment: Assay Detects: benzodiazepines and metabolites at varying concentrations. Lowest detectable concentration is 200 ng/mL of oxazepam.        Cocaine Screen, Urine Not Detected     Comment: Assay Detects: benzoylecgonine and cocaine in urine. Lowest detectable concentration is 300 ng/mL of benzoylecgonine.        Amphetamine+Methamphetamine Screen, Ur Not Detected     Comment: Assay Detects: d-methamphetamine, d-amphetamine, methlyenedioxyamphetamine (MDA), and methlyenendioxymethamphetamine (MDMA) in urine. Lowest detectable concentration is 1000 ng/mL of d-methamphetamine.<br>Assay is less sensitive to MDA and MDMA (lowest detectable concentration, 2500 ng/mL) and could produce a false negative result. If MDMA overdose is suspected and the result is negative, a more specific test should be requested.        Cannabinoid Screen, Urine Positive     Comment: Assay Detects: cannabinoid metabolites in urine. Lowest detectable concentration is 50 ng/mL        Opiate Scrn, Ur Not Detected     Comment: Assay Detects: codeine, dihydrocodeine, hydrocodone, hydromorphone, levorphanol, morphine, morphine-3-glucuronide, norcodeine, oxycodone in urine. Lowest detectable concentration is 300 ng/mL of morphine.        Barbiturate Screen, Ur Not Detected     Comment: Assay Detects: alphenal, amobarbital, aprobarbital, barbital, butabarbital, butalbital, butethal, diallybarbital, pentobarbital, secobarbital,talbutal, and thiopental.  Lowest detectable concentration is 200 ng/mL of secobarbital.        Fentanyl Screen, Urine Not Detected     Comment: Assay Detects: fentanyl metabolite in urine, lowest detectable concentration is 5 ng/mL of norfentanyl.       Comprehensive metabolic panel [[id number]]  (Abnormal) Collected: 08/02/24 2304    Specimen: Blood, Venous Updated: 08/02/24 2333     Sodium 145 mEQ/L      Potassium 3.3 mEQ/L      Comment: Results obtained on plasma. Plasma Potassium values may be up to 0.4 mEQ/L less than serum values. The differences may be greater for patients with high platelet or white cell counts.        Chloride 112 mEQ/L      CO2 25 mEQ/L      BUN 12 mg/dL      Creatinine 1.1 mg/dL      Glucose 102 mg/dL      Calcium 7.7 mg/dL      AST (SGOT) 21 IU/L      ALT (SGPT) 14 IU/L      Alkaline Phosphatase 57 IU/L      Total Protein 6.0 g/dL      Comment: Test performed on plasma which typically contains approximately 0.4 g/dL more protein than serum.        Albumin 4.0 g/dL      Bilirubin, Total 0.2 mg/dL      eGFR >60.0 mL/min/1.73m*2      Comment: Calculation based on the Chronic Kidney Disease Epidemiology Collaboration (CKD-EPI) equation refit without adjustment for race.        Anion Gap 8 mEQ/L     Lipase [[id number]]  (Normal) Collected: 08/02/24 2304    Specimen: Blood, Venous Updated: 08/02/24 2333     Lipase 35 U/L     Protime-INR [[id number]]  (Normal) Collected: 08/02/24 2304    Specimen: Blood, Venous Updated: 08/02/24 2325     PT 13.4 sec      INR 1.0     Comment: INR has no defined significance when PT is within Reference Range.       PTT [[id number]]  (Normal) Collected: 08/02/24 2304    Specimen: Blood, Venous Updated: 08/02/24 2325     PTT 28 sec     CBC and differential [[id number]]  (Abnormal) Collected: 08/02/24 2304    Specimen: Blood, Venous Updated: 08/02/24 2316     WBC 7.43 K/uL      RBC 3.90 M/uL      Hemoglobin 13.0 g/dL      Hematocrit 37.8 %      MCV 96.9 fL      MCH 33.3 pg      MCHC 34.4 g/dL       RDW 13.0 %      Platelets 240 K/uL      MPV 9.0 fL      Differential Type Auto     nRBC 0.0 %      Immature Granulocytes 0.1 %      Neutrophils 43.3 %      Lymphocytes 48.0 %      Monocytes 4.8 %      Eosinophils 3.4 %      Basophils 0.4 %      Immature Granulocytes, Absolute 0.01 K/uL      Neutrophils, Absolute 3.21 K/uL      Lymphocytes, Absolute 3.57 K/uL      Monocytes, Absolute 0.36 K/uL      Eosinophils, Absolute 0.25 K/uL      Basophils, Absolute 0.03 K/uL     Urinalysis [[id number]]  (Normal) Collected: 08/02/24 2305    Specimen: Urine, Clean Catch Updated: 08/02/24 2316    Narrative:      The following orders were created for panel order Urinalysis.  Procedure                               Abnormality         Status                     ---------                               -----------         ------                     UA Macroscopic[[id number]]               Normal              Final result                 Please view results for these tests on the individual orders.    UA Macroscopic [[id number]]  (Normal) Collected: 08/02/24 2305    Specimen: Urine, Clean Catch Updated: 08/02/24 2316     Color, Urine Colorless     Clarity, Urine Clear     Specific Gravity, Urine 1.007     pH, Urine 6.5     Leukocyte Esterase Negative     Comment: Results can be falsely negative due to high specific gravity, some antibiotics, glucose >3 g/dl, or WBC other than neutrophils.        Nitrite, Urine Negative     Protein, Urine Negative     Glucose, Urine Negative mg/dL      Ketones, Urine Negative mg/dL      Urobilinogen, Urine 0.2 EU/dL      Bilirubin, Urine Negative mg/dL      Blood, Urine Negative     Comment: The sensitivity of the occult blood test is equivalent to approximately 4 intact RBC/HPF.       RAINBOW GOLD [[id number]] Collected: 08/02/24 2304    Specimen: Blood, Venous Updated: 08/02/24 2311    RAINBOW RED [[id number]] Collected: 08/02/24 2304    Specimen: Blood, Venous Updated: 08/02/24 2311    [place] Draw Panel  [[id number]] Collected: 08/02/24 2304    Specimen: Blood, Venous Updated: 08/02/24 2311    Narrative:      The following orders were created for panel order [place] Draw Panel.  Procedure                               Abnormality         Status                     ---------                               -----------         ------                     RAINBOW RED[[id number]]                                      In process                 RAINBOW GOLD[[id number]]                                     In process                 RAINBOW LT GREEN[[id number]]                                 In process                   Please view results for these tests on the individual orders.    RAINBOW LT GREEN [[id number]] Collected: 08/02/24 2304    Specimen: Blood, Venous Updated: 08/02/24 2311            Imaging Results              CT CHEST WITH IV CONTRAST (In process)                      CT ABDOMEN PELVIS WITH IV CONTRAST (In process)                      CT HEAD WITHOUT IV CONTRAST (In process)                      CT CERVICAL SPINE WITHOUT IV CONTRAST (In process)                     No orders to display       Scoring tools                                  ED Course & MDM   MDM / ED COURSE / CLINICAL IMPRESSION / DISPO     Medical Decision Making  Amount and/or Complexity of Data Reviewed  Labs: ordered. Decision-making details documented in ED Course.  Radiology: ordered.    Risk  Prescription drug management.        ED Course as of 08/03/24 0520   Fri Aug 02, 2024   2347 Ethanol(!!): 441 [MS]   2347 Cannabinoid Screen, Urine(!): Positive [MS]   2347 Potassium, Bld(!): 3.3 [MS]   Sat Aug 03, 2024   0016 Discussed with trauma, no evidence of acute traumatic injuries on CT pan scan.  Trauma team to suture left ear laceration.  Plan for metabolize, reassessment when sober for dispo [MS]   0253 6 sutures placed lacerations of L ear  [DB]   0520 Patient is otherwise well-appearing at this time ambulatory without difficulty speaking  full sentences spoke to his sister on the phone who was able to order a movement states she will be waiting for him when she gets home was given the number to call back if there are any issues [RW]      ED Course User Index  [DB] Jane Norris PA C  [MS] Mauricio Mccullough MD  [RW] Kwame Lama MD     Clinical Impression      None                 Jane Norris PA C  08/03/24 0236

## 2024-08-05 NOTE — PROCEDURE: MEDICATION COUNSELING
Instructions: This plan will send the code FBSE to the PM system.  DO NOT or CHANGE the price. Price (Do Not Change): 0.00 Detail Level: Simple Solaraze Pregnancy And Lactation Text: This medication is Pregnancy Category B and is considered safe. There is some data to suggest avoiding during the third trimester. It is unknown if this medication is excreted in breast milk.

## 2025-01-02 NOTE — PROCEDURE: PRESCRIPTION MEDICATION MANAGEMENT
Render In Strict Bullet Format?: No
Plan: Recommended OTC Minoxidil 5% topical foam/solution QAM
Initiate Treatment: triamcinolone acetonide 0.1% lotion: Apply a thin layer QHS to AA of scalp and beard area
Detail Level: Zone
25

## 2025-02-26 NOTE — PROCEDURE: MEDICATION COUNSELING
[Good] : ~his/her~  mood as  good [Yes] : Yes [No] : In the past 12 months have you used drugs other than those required for medical reasons? No [0] : 2) Feeling down, depressed, or hopeless: Not at all (0) [PHQ-2 Negative - No further assessment needed] : PHQ-2 Negative - No further assessment needed [Former] : Former [NO] : No [Patient reported mammogram was normal] : Patient reported mammogram was normal [Patient reported bone density results were abnormal] : Patient reported bone density results were abnormal [FreeTextEntry1] : health maintenance [de-identified] : social [[id number]] : 0 [MammogramDate] : 02/24 [MammogramComments] : BIRADS 2- Benign: No mammographic evidence of malignancy.  [PapSmearComments] : n/a [BoneDensityDate] : 01/23 [BoneDensityComments] : Osteopenia and moderate fracture risk. [ColonoscopyComments] : to follow up with gi Hydroquinone Counseling:  Patient advised that medication may result in skin irritation, lightening (hypopigmentation), dryness, and burning.  In the event of skin irritation, the patient was advised to reduce the amount of the drug applied or use it less frequently.  Rarely, spots that are treated with hydroquinone can become darker (pseudoochronosis).  Should this occur, patient instructed to stop medication and call the office. The patient verbalized understanding of the proper use and possible adverse effects of hydroquinone.  All of the patient's questions and concerns were addressed.